# Patient Record
Sex: MALE | Race: WHITE
[De-identification: names, ages, dates, MRNs, and addresses within clinical notes are randomized per-mention and may not be internally consistent; named-entity substitution may affect disease eponyms.]

---

## 2017-05-10 ENCOUNTER — HOSPITAL ENCOUNTER (OUTPATIENT)
Dept: HOSPITAL 47 - RADXRMAIN | Age: 62
Discharge: HOME | End: 2017-05-10
Payer: COMMERCIAL

## 2017-05-10 DIAGNOSIS — S43.402A: Primary | ICD-10-CM

## 2017-05-10 NOTE — XR
EXAMINATION TYPE: XR shoulder complete LT

 

DATE OF EXAM: 5/10/2017 10:40 AM

 

COMPARISON: NONE

 

HISTORY: Pain

 

TECHNIQUE: Three views are submitted.

 

FINDINGS:

The osseous structures are intact.  There is no acute fracture or dislocation.  The AC joint is maint
ained.  

 

IMPRESSION:

1. No acute process.  If there is concern for internal derangement then consider MRI.

## 2018-06-26 ENCOUNTER — HOSPITAL ENCOUNTER (OUTPATIENT)
Dept: HOSPITAL 47 - EC | Age: 63
Setting detail: OBSERVATION
LOS: 1 days | Discharge: HOME | End: 2018-06-27
Attending: INTERNAL MEDICINE | Admitting: INTERNAL MEDICINE
Payer: COMMERCIAL

## 2018-06-26 DIAGNOSIS — R07.2: ICD-10-CM

## 2018-06-26 DIAGNOSIS — M25.512: ICD-10-CM

## 2018-06-26 DIAGNOSIS — Z79.82: ICD-10-CM

## 2018-06-26 DIAGNOSIS — R19.7: ICD-10-CM

## 2018-06-26 DIAGNOSIS — I10: ICD-10-CM

## 2018-06-26 DIAGNOSIS — Z82.49: ICD-10-CM

## 2018-06-26 DIAGNOSIS — Z98.84: ICD-10-CM

## 2018-06-26 DIAGNOSIS — Z86.73: ICD-10-CM

## 2018-06-26 DIAGNOSIS — S00.03XA: ICD-10-CM

## 2018-06-26 DIAGNOSIS — E78.5: ICD-10-CM

## 2018-06-26 DIAGNOSIS — G45.9: Primary | ICD-10-CM

## 2018-06-26 DIAGNOSIS — R07.89: ICD-10-CM

## 2018-06-26 DIAGNOSIS — Z79.899: ICD-10-CM

## 2018-06-26 DIAGNOSIS — E11.65: ICD-10-CM

## 2018-06-26 DIAGNOSIS — G89.29: ICD-10-CM

## 2018-06-26 DIAGNOSIS — I25.2: ICD-10-CM

## 2018-06-26 LAB
ALBUMIN SERPL-MCNC: 4 G/DL (ref 3.5–5)
ALP SERPL-CCNC: 65 U/L (ref 38–126)
ALT SERPL-CCNC: 31 U/L (ref 21–72)
ANION GAP SERPL CALC-SCNC: 12 MMOL/L
APTT BLD: 22.5 SEC (ref 22–30)
AST SERPL-CCNC: 29 U/L (ref 17–59)
BASOPHILS # BLD AUTO: 0 K/UL (ref 0–0.2)
BASOPHILS NFR BLD AUTO: 1 %
BUN SERPL-SCNC: 21 MG/DL (ref 9–20)
CALCIUM SPEC-MCNC: 9.5 MG/DL (ref 8.4–10.2)
CHLORIDE SERPL-SCNC: 100 MMOL/L (ref 98–107)
CK SERPL-CCNC: 119 U/L (ref 55–170)
CK SERPL-CCNC: 132 U/L (ref 55–170)
CK SERPL-CCNC: 158 U/L (ref 55–170)
CO2 SERPL-SCNC: 25 MMOL/L (ref 22–30)
EOSINOPHIL # BLD AUTO: 0.3 K/UL (ref 0–0.7)
EOSINOPHIL NFR BLD AUTO: 5 %
ERYTHROCYTE [DISTWIDTH] IN BLOOD BY AUTOMATED COUNT: 5.54 M/UL (ref 4.3–5.9)
ERYTHROCYTE [DISTWIDTH] IN BLOOD: 13.7 % (ref 11.5–15.5)
GLUCOSE SERPL-MCNC: 171 MG/DL (ref 74–99)
HCT VFR BLD AUTO: 45.9 % (ref 39–53)
HGB BLD-MCNC: 15.2 GM/DL (ref 13–17.5)
INR PPP: 1 (ref ?–1.2)
LYMPHOCYTES # SPEC AUTO: 1.6 K/UL (ref 1–4.8)
LYMPHOCYTES NFR SPEC AUTO: 26 %
MCH RBC QN AUTO: 27.4 PG (ref 25–35)
MCHC RBC AUTO-ENTMCNC: 33.1 G/DL (ref 31–37)
MCV RBC AUTO: 82.8 FL (ref 80–100)
MONOCYTES # BLD AUTO: 0.4 K/UL (ref 0–1)
MONOCYTES NFR BLD AUTO: 7 %
NEUTROPHILS # BLD AUTO: 3.7 K/UL (ref 1.3–7.7)
NEUTROPHILS NFR BLD AUTO: 59 %
PLATELET # BLD AUTO: 267 K/UL (ref 150–450)
POTASSIUM SERPL-SCNC: 3.6 MMOL/L (ref 3.5–5.1)
PROT SERPL-MCNC: 6.4 G/DL (ref 6.3–8.2)
PT BLD: 9.9 SEC (ref 9–12)
SODIUM SERPL-SCNC: 137 MMOL/L (ref 137–145)
TROPONIN I SERPL-MCNC: 0.01 NG/ML (ref 0–0.03)
TROPONIN I SERPL-MCNC: <0.012 NG/ML (ref 0–0.03)
TROPONIN I SERPL-MCNC: <0.012 NG/ML (ref 0–0.03)
WBC # BLD AUTO: 6.2 K/UL (ref 3.8–10.6)

## 2018-06-26 PROCEDURE — 70450 CT HEAD/BRAIN W/O DYE: CPT

## 2018-06-26 PROCEDURE — 80053 COMPREHEN METABOLIC PANEL: CPT

## 2018-06-26 PROCEDURE — 36415 COLL VENOUS BLD VENIPUNCTURE: CPT

## 2018-06-26 PROCEDURE — 71046 X-RAY EXAM CHEST 2 VIEWS: CPT

## 2018-06-26 PROCEDURE — 83735 ASSAY OF MAGNESIUM: CPT

## 2018-06-26 PROCEDURE — 99285 EMERGENCY DEPT VISIT HI MDM: CPT

## 2018-06-26 PROCEDURE — 96374 THER/PROPH/DIAG INJ IV PUSH: CPT

## 2018-06-26 PROCEDURE — 93306 TTE W/DOPPLER COMPLETE: CPT

## 2018-06-26 PROCEDURE — 84100 ASSAY OF PHOSPHORUS: CPT

## 2018-06-26 PROCEDURE — 96361 HYDRATE IV INFUSION ADD-ON: CPT

## 2018-06-26 PROCEDURE — 85730 THROMBOPLASTIN TIME PARTIAL: CPT

## 2018-06-26 PROCEDURE — 83090 ASSAY OF HOMOCYSTEINE: CPT

## 2018-06-26 PROCEDURE — 93351 STRESS TTE COMPLETE: CPT

## 2018-06-26 PROCEDURE — 82553 CREATINE MB FRACTION: CPT

## 2018-06-26 PROCEDURE — 70551 MRI BRAIN STEM W/O DYE: CPT

## 2018-06-26 PROCEDURE — 80048 BASIC METABOLIC PNL TOTAL CA: CPT

## 2018-06-26 PROCEDURE — 93880 EXTRACRANIAL BILAT STUDY: CPT

## 2018-06-26 PROCEDURE — 85025 COMPLETE CBC W/AUTO DIFF WBC: CPT

## 2018-06-26 PROCEDURE — 85610 PROTHROMBIN TIME: CPT

## 2018-06-26 PROCEDURE — 80061 LIPID PANEL: CPT

## 2018-06-26 PROCEDURE — 84484 ASSAY OF TROPONIN QUANT: CPT

## 2018-06-26 PROCEDURE — 93005 ELECTROCARDIOGRAM TRACING: CPT

## 2018-06-26 PROCEDURE — 96375 TX/PRO/DX INJ NEW DRUG ADDON: CPT

## 2018-06-26 PROCEDURE — 95819 EEG AWAKE AND ASLEEP: CPT

## 2018-06-26 PROCEDURE — 82550 ASSAY OF CK (CPK): CPT

## 2018-06-26 RX ADMIN — NITROGLYCERIN STA: 20 OINTMENT TOPICAL at 14:25

## 2018-06-26 RX ADMIN — NITROGLYCERIN STA: 20 OINTMENT TOPICAL at 14:23

## 2018-06-26 RX ADMIN — CALCIUM POLYCARBOPHIL SCH: 625 TABLET, FILM COATED ORAL at 23:00

## 2018-06-26 NOTE — CONS
CONSULTATION



CHIEF COMPLAINT:

Left facial numbness.



Mr. Junior is a 63-year-old gentleman who is from [place] who works here in

[place] presented to the hospital with sudden onset of left-sided facial numbness.

He describes it as the feeling he gets when he gets injected by Novocain at a dentist.

He subsequently had some chest discomfort and has chronic left shoulder pain.  The

facial numbness has gradually resolved by the time he came to the ER.  The patient has

a history of TIA also. The patient had some sense of dizziness and he also had some

chest tightness with elevated blood pressures.  These symptoms have since resolved.  He

also received a dose of morphine in the ER for chest pain.  At the time of my

evaluation, he appears comfortable at rest.  Does not have any left facial numbness and

does not have any chest pain.  An EKG on him shows sinus rhythm with evidence of prior

inferior wall myocardial infarction.  The patient's clinical presentation could be

related to a TIA or the left facial numbness may be related to underlying significant

obstructive CAD.  He had a CT scan of the brain that is negative.  I asked the ER

physician to talk to the neurologist and see if the patient can be started on IV

heparin.  Also the patient tells me that he lives in very close to [facility] and

wishes to go there so that it is easy for his wife to commute and see him in the

hospital, so I asked the ER doctor to make arrangements for the patient to go to

[facility]. If he is not going there and he is admitted here, we will do further

workup depending upon what the neurologist thinks. If this is not an episode of TIA,

then I will do a cardiac catheterization on him.  If they think it is TIA, will

consider a stress test either during hospitalization or after discharge. I discussed

these issues at length with the patient.  He understands and in agreement with the

plan.



PAST MEDICAL HISTORY:

Significant for hypertension and dyslipidemia.



MEDICATIONS:

At home include Catapres 0.2 t.i.d., Wellbutrin, Norvasc 5 daily, lisinopril 40 daily,

Lipitor 40 daily and aspirin.



ALLERGIES:

There are no known drug allergies.



FAMILY HISTORY:

Negative for premature coronary artery disease.



SOCIAL HISTORY:

Negative for smoking, EtOH abuse, or drug abuse.



PAST SURGICAL HISTORY:

Significant for gastric stapling.



REVIEW OF SYSTEMS:

HEENT is unremarkable.

CARDIAC: As described above.

RESPIRATORY: Negative.

GI: Negative.

GENITOURINARY: Negative.

ALLERGY/IMMUNOLOGY: Negative.

SKIN: Negative.

MUSCULOSKELETAL: Negative.

ENDOCRINE: Negative.

DERM: Negative.

CONSTITUTIONAL: Negative.

ONCOLOGICAL: Negative.

CNS: As described above.



Rest of the system review is not relevant.



EXAM:

Comfortable at rest.  Afebrile.  Heart rate is 70 beats per minute.  Blood pressure is

150/80, respirations 18, O2 sat is 100% on 2 L. There is no jugular venous distention.

Chest exam reveals good air entry bilaterally.  Heart exam reveals first and second

heart sounds.  No gallop.  No murmur.  No rub.  Abdomen is soft, nontender.  Exam of

extremities did not reveal any edema.  Peripheral pulses are felt. CNS exam did not

reveal focal neurological deficits.



EKG shows sinus rhythm with evidence of prior inferior wall myocardial infarction.



LABS:

Labs show that the 1st set of troponin is negative.  Potassium is 3.6, hemoglobin is

15.2.



ASSESSMENT:

1. Precordial chest pain.

2. Left facial numbness, rule out transient ischemic attack.

3. Hypertension.

4. Dyslipidemia.



PLAN:

Will control the blood pressure.  Start IV heparin once a neurologist okays it and

further care based on whether he is transferred to [place] or he stays here.





MMODL / IJN: [id number] / Job#: [id number]

## 2018-06-26 NOTE — CT
EXAMINATION TYPE: CT brain wo con

 

DATE OF EXAM: 6/26/2018

 

HISTORY: Numbness and Slurred Speech, neurodeficits per order.

 

CT DLP: 1290 mGycm.  Automated Exposure Control for Dose Reduction was Utilized.

 

TECHNIQUE: CT scan of the head is performed without contrast.

 

COMPARISON: None.

 

FINDINGS:   There is no acute intracranial hemorrhage or midline shift identified. There is diffuse v
entricular and sulcal prominence consistent with diffuse age-related cerebral atrophy.  There is low-
attenuation in the periventricular white matter consistent with chronic small vessel ischemic change.
 Area of lacunar infarction left thalamus is present axial image 31. There is moderate size left post
erior frontal hematoma axial image 44. The adjacent calvarium is intact. Streak artifact limits evalu
ation at level of foramen magnum from cavitary fillings. Some vascular calcification distal internal 
carotid and vertebral arteries is noted.

 

IMPRESSION:  No acute intracranial hemorrhage or midline shift.  There is mild diffuse age-related ce
rebral atrophy and mild to moderate chronic small vessel ischemic change as well as old left thalamic
 lacunar infarct all redemonstrated.  There is moderate-sized acute left posterior frontal scalp hema
sally noted.

 

If clinical concern for acute stroke persists further investigation with MRI study may be warranted.

## 2018-06-26 NOTE — XR
EXAMINATION TYPE: XR chest 2V

 

DATE OF EXAM: 6/26/2018

 

COMPARISON: NONE

 

HISTORY: Facial numbness and weakness.

 

TECHNIQUE:  Frontal and lateral views of the chest are obtained.

 

FINDINGS: Low lung volumes are present.  There is no focal air space opacity, pleural effusion, or pn
eumothorax seen.  The cardiac silhouette size is within normal limits. Retrocardiac opacity consisten
t with moderate size hiatal hernia is noted. There is moderate to severe multilevel anterior and late
ral spurring in the spine. Surgical clips epigastric region are noted. There is partial visualization
 of surgical hardware left shoulder level.

 

IMPRESSION:  Low lung volumes without suspicious acute pulmonary process.

## 2018-06-26 NOTE — P.HPIM
History of Present Illness


H&P Date: 06/26/18


Chief Complaint: Left facial numbness





63-year-old male with history of hypertension and TIA presented to emergency 

department because this morning while he was at work he suddenly felt flushed 

in the face, his eyes were red and the left side of his face around the mouth 

went numb.  He denied having any weakness in any of his extremities or weakness 

in the face.  Currently is feeling in his face is back.  No blurry vision or 

double vision, no slurred speech.  He felt slightly dizzy and had left-sided 

chest pain as well.  The chest pain continued until the time of evaluation.  

The pain was located over the left shoulder and he wasn't sure if it was truly 

chest pain or just shoulder discomfort.  Patient was able to walk okay after he 

had this episode.  No sick contacts.  No recent illness, no fevers or chills, 

no nausea or vomiting.  Patient stated that he had an episode of diarrhea today.





Review of Systems





12 point review of system performed, negative except for HPI





Past Medical History


Past Medical History: CVA/TIA, Diabetes Mellitus, Hyperlipidemia, Hypertension


History of Any Multi-Drug Resistant Organisms: None Reported


Past Surgical History: Back Surgery, Bariatric Surgery, Joint Replacement


Additional Past Surgical History / Comment(s): gastric sleeve, left shoulder 

replacement


Past Psychological History: No Psychological Hx Reported


Smoking Status: Never smoker


Past Alcohol Use History: Rare


Past Drug Use History: None Reported





- Past Family History


  ** Mother


Additional Family Medical History / Comment(s): mom is alive at age 93 has a 

pacemaker





  ** Father


Family Medical History: CVA/TIA, Myocardial Infarction (MI)





Medications and Allergies


 Home Medications











 Medication  Instructions  Recorded  Confirmed  Type


 


Aspirin EC [Ecotrin Low Dose] 81 mg PO HS 06/26/18 06/26/18 History


 


Atorvastatin [Lipitor] 40 mg PO HS 06/26/18 06/26/18 History


 


Calcium Polycarbophil [Fibercon] 1,250 mg PO BID 06/26/18 06/26/18 History


 


Lisinopril 40 mg PO DAILY 06/26/18 06/26/18 History


 


amLODIPine [Norvasc] 5 mg PO DAILY 06/26/18 06/26/18 History


 


buPROPion HCL [Wellbutrin XL] 300 mg PO DAILY 06/26/18 06/26/18 History


 


cloNIDine HCL [Catapres] 0.2 mg PO TID 06/26/18 06/26/18 History











 Allergies











Allergy/AdvReac Type Severity Reaction Status Date / Time


 


No Known Allergies Allergy   Verified 06/26/18 09:25














Physical Exam


Vitals: 


 Vital Signs











  Temp Pulse Resp BP Pulse Ox


 


 06/26/18 11:05   72  18  150/83  100


 


 06/26/18 09:43   65  18  161/88  98


 


 06/26/18 07:56  98.1 F  74  18  202/127  98








 Intake and Output











 06/25/18 06/26/18 06/26/18





 22:59 06:59 14:59


 


Other:   


 


  Weight   96.162 kg














Constitutional: No acute distress, conversant, pleasant


Eyes:Anicteric sclerae, moist conjunctiva, no lid-lag, PERRLA, 


ENMT: Oropharynx clear, no erythema, exudates


Neck: Supple, FROM, no masses, or JVD, No carotid bruits, No thyromegaly


Lungs: Clear to auscultation, Clear to percussion, Normal respiratory effort, 

no accessory muscle use 


Cardiovascular: Heart regular in rate and rhythm, No murmurs, gallops, or rubs, 

No peripheral edema


Abdominal: Soft, Nontender, no guarding, rebound or rigidity, Normoactive bowel 

sounds, No hepatomegaly, No splenomegaly, No palpable mass 


Skin: Normal temperature, tone, texture, turgor, no induration, No subcutaneous 

nodules, No rash, lesions, No ulcers


Extremities: No digital cyanosis, No clubbing, Pedal pulses intact and 

symmetrical, Radial pulses intact and symmetrical, No calf tenderness 


Psychiatric: Alert and oriented to person, place and time, appropriate affect, 

intact judgement         


Neuro: Muscles Strength 5/5 in all 4 extremities, Sensation to light touch 

grossly present throughout, Cranial nerves II-XII grossly intact, no focal 

sensory deficits








Results


CBC & Chem 7: 


 06/26/18 08:22





 06/26/18 08:22


Labs: 


 Abnormal Lab Results - Last 24 Hours (Table)











  06/26/18 Range/Units





  08:22 


 


BUN  21 H  (9-20)  mg/dL


 


Glucose  171 H  (74-99)  mg/dL














Assessment and Plan


Plan: 





Left facial numbness


Could be secondary to TIA or stroke


Admit to observation with telemetry


MRI of the brain to rule out stroke


Neurology consult


No need for full stroke workup at this point as his symptoms are not clearly 

secondary to stroke/TIA.





Left-sided chest pain


Telemetry


Cycle troponins


Cardiology evaluation





Hyperlipidemia/hypertension essential


Stable


Resume home meds





DVT prophylaxis


Ambulatory, low risk





Anticipated discharge: 1-2 days


Disposition: Likely home

## 2018-06-26 NOTE — CONS
CONSULTATION



DATE OF CONSULTATION:

06/26/2018



CHIEF COMPLAINT:

Transient ischemic attack.



HISTORY OF PRESENT ILLNESS:

The patient is a pleasant 63-year-old  male who is being evaluated by the

Neurology service per the request of Dr. Monroe for a transient ischemic attack.  The

patient was brought into [facility] after he had a sudden onset

of numbness and tingling involving his left face.  The patient states that he did

evaluate his face on the mirror and he noticed no facial drooping or weakness.  The

numbness lasted approximately 1 hour and resolved spontaneously.  In the emergency

room, a CT scan of the brain was done, which showed generalized atrophy and small-

vessel ischemic changes.  His blood pressure was found to be significantly elevated at

202/127.  He was given IV antihypertensive and his blood pressure is better controlled

at this time.  In the emergency room, the patient developed a severe chest pain.  His

EKG was normal and his cardiac enzymes have been negative thus far after two sets.

Cardiology has been consulted.  The patient does take aspirin daily at home.  His CBC

and INR were normal.  His comprehensive metabolic profile was normal except for

hyperglycemia at 171.  At the time of my evaluation, the patient is resting in his bed

and appears to be in no acute distress.  He denies any recurrence of any neurological

symptoms at this time.  He does report a previous history of transient ischemic attack,

which is why he takes aspirin daily.



PAST MEDICAL HISTORY:

Transient ischemic attack, diabetes, dyslipidemia, hypertension, history of spine

surgery, joint replacement surgery, bariatric surgery, gastric sleeve, left shoulder

surgery.



SOCIAL HISTORY:

He denies any tobacco or drug use.  He rarely drinks alcohol.



FAMILY HISTORY:

Positive for heart disease and strokes.



HOME MEDICATIONS:

Reviewed in the chart.



ALLERGIES:

No known drug allergies.



REVIEW OF SYSTEMS:

CONSTITUTIONAL:  Negative.

EYES:  Negative.

ENT:  Negative.

CARDIOVASCULAR:  As mentioned above.

RESPIRATORY:  Negative.

NEUROLOGICAL:  As mentioned above.

GASTROINTESTINAL:  Negative.

GENITOURINARY:  Negative.

PSYCHIATRIC:  Negative.

ENDOCRINE:  Positive for diabetes.

MUSCULOSKELETAL:  Positive for occasional joint pain.

DERMATOLOGICAL:  Negative.



PHYSICAL EXAM:

Vital signs show a temperature of 98.4, pulse 99, respiration 18, blood pressure

185/91.

GENERAL APPEARANCE:  The patient is a well-developed  male, who appears to be

in no acute distress.

HEENT:  Normocephalic, atraumatic, no facial asymmetry is seen. Extraocular muscles are

intact.

NECK:  Supple with no masses felt.

CARDIOVASCULAR:  Regular rate and rhythm.

ABDOMEN:  Nontender, nondistended.

Extremities showed no edema or clubbing.  NEUROLOGICAL EXAM:  The patient is awake and

oriented x3.  Speech and language are normal.  Strength is full in all 4 extremities.

Sensory exam was normal to light touch in all 4 extremities.  No pronator drift is

seen.  Finger-nose-finger testing showed no dysmetria.  No facial asymmetry is noticed

on cranial nerve testing.



IMPRESSION:

1. Transient ischemic attack.

2. Left facial numbness, resolved.

3. Atypical chest pain.

4. Small vessel ischemic disease.



RECOMMENDATION:

The patient does appear to have suffered a transient ischemic attack with a transient

episode of left facial numbness and tingling.  His symptoms did resolve after 1 hour

without any recurrence.  The patient was on aspirin daily at home.  I will switch his

aspirin to Plavix 75 mg daily.  I will order a carotid Doppler, fasting lipid panel,

EEG, and serum homocystine level.  His blood pressure continues to be elevated,

although it is improved from his emergency room arrival blood pressure.  Cardiology is

following the patient.  Continue neuro checks.  I will continue to follow with you.

Further recommendations to follow.



Thank you for allowing me to participate in the care of your patient.  If you have any

questions, please feel free to contact me.





MMKHADARL / IJN: [id number] / Job#: [id number]

## 2018-06-26 NOTE — ED
Neuro HPI





- General


Chief Complaint: Neuro Symptoms/Deficit


Stated Complaint: Numbness, slurred speech


Time Seen by Provider: 06/26/18 08:27


Source: patient


Mode of arrival: ambulatory


Limitations: no limitations





- History of Present Illness


Is the patient presenting with stroke symptoms?: No


Initial Comments: 


63 years old male with a history of TIA, hypertension and diabetes type 2 he is 

a status post gastric sleeve he has lost over 70 pounds now he is not on any 

medication at this point early morning around 7:30 he has a left-sided facial 

numbness and facial numbness has resolved by the time he arrived here he has a 

history of TIAs and now he is feeling dizzy prior to arrival to the ER he 

noticed some chest pains chest pains in the left side they do not radiate 

anywhere no nausea no vomiting no cold sweats a blood pressure is quite 

elevated blood pressure was close to 200 systolic on arrival review of system 

is otherwise unremarkable no motor deficits right now








- Related Data


Home Medications: 


 Home Medications











 Medication  Instructions  Recorded  Confirmed


 


Aspirin EC [Ecotrin Low Dose] 81 mg PO HS 06/26/18 06/26/18


 


Atorvastatin [Lipitor] 40 mg PO HS 06/26/18 06/26/18


 


Calcium Polycarbophil [Fibercon] 1,250 mg PO BID 06/26/18 06/26/18


 


Lisinopril 40 mg PO DAILY 06/26/18 06/26/18


 


amLODIPine [Norvasc] 5 mg PO DAILY 06/26/18 06/26/18


 


buPROPion HCL [Wellbutrin XL] 300 mg PO DAILY 06/26/18 06/26/18


 


cloNIDine HCL [Catapres] 0.2 mg PO TID 06/26/18 06/26/18











Allergies/Adverse Reactions: 


 Allergies











Allergy/AdvReac Type Severity Reaction Status Date / Time


 


No Known Allergies Allergy   Verified 06/26/18 09:25














Review of Systems


ROS Statement: 


Those systems with pertinent positive or pertinent negative responses have been 

documented in the HPI.





ROS Other: All systems not noted in ROS Statement are negative.





General Exam





- General Exam Comments


Initial Comments: 


General:  The patient is awake and alert, in no distress, and does not appear 

acutely ill. 


Skin:  Skin is warm and dry and no rashes or lesions are noted. 


Eye:  Pupils are equal, round and reactive to light, extra-ocular movements are 

intact; there is normal conjunctiva bilaterally.  


Ears, nose, mouth and throat:  There are moist mucous membranes and no oral 

lesions. 


Neck:  The neck is supple, there is no tenderness  or JVD.  


Cardiovascular:  There is a regular rate and rhythm. No murmur, rub or gallop 

is appreciated.


Respiratory: To auscultation bilateral, no wheezing no rhonchi no distress  

respiratory wise noticed


Gastrointestinal:  Soft, non-distended, non-tender abdomen without masses or 

organomegaly noted. There is no rebound or guarding present. Bowel sounds are 

unremarkable. 


Back:  There is no tenderness to palpation in the midline. There is no obvious 

deformity.


Musculoskeletal:  Normal ROM, no tenderness, There is no pedal edema. There is 

no calf tenderness or swelling. No cords were appreciated.  


Neurological:  CN II-XII intact, Cranial nerves III through XII are intact. 

There are no obvious motor or sensory deficits. Coordination appears grossly 

intact. Speech is normal.


Neuro deficits noticed at all


Psychiatric:  Cooperative, appropriate mood & affect, normal judgment.  








Limitations: no limitations





Stroke MDM





- Lab Data


Result diagrams: 


 06/26/18 08:22





 06/26/18 08:22





 Lab Results











  06/26/18 06/26/18 06/26/18 Range/Units





  08:22 08:22 08:22 


 


WBC   6.2   (3.8-10.6)  k/uL


 


RBC   5.54   (4.30-5.90)  m/uL


 


Hgb   15.2   (13.0-17.5)  gm/dL


 


Hct   45.9   (39.0-53.0)  %


 


MCV   82.8   (80.0-100.0)  fL


 


MCH   27.4   (25.0-35.0)  pg


 


MCHC   33.1   (31.0-37.0)  g/dL


 


RDW   13.7   (11.5-15.5)  %


 


Plt Count   267   (150-450)  k/uL


 


Neutrophils %   59   %


 


Lymphocytes %   26   %


 


Monocytes %   7   %


 


Eosinophils %   5   %


 


Basophils %   1   %


 


Neutrophils #   3.7   (1.3-7.7)  k/uL


 


Lymphocytes #   1.6   (1.0-4.8)  k/uL


 


Monocytes #   0.4   (0-1.0)  k/uL


 


Eosinophils #   0.3   (0-0.7)  k/uL


 


Basophils #   0.0   (0-0.2)  k/uL


 


PT     (9.0-12.0)  sec


 


INR     (<1.2)  


 


APTT     (22.0-30.0)  sec


 


Sodium    137  (137-145)  mmol/L


 


Potassium    3.6  (3.5-5.1)  mmol/L


 


Chloride    100  ()  mmol/L


 


Carbon Dioxide    25  (22-30)  mmol/L


 


Anion Gap    12  mmol/L


 


BUN    21 H  (9-20)  mg/dL


 


Creatinine    0.84  (0.66-1.25)  mg/dL


 


Est GFR (CKD-EPI)AfAm    >90  (>60 ml/min/1.73 sqM)  


 


Est GFR (CKD-EPI)NonAf    >90  (>60 ml/min/1.73 sqM)  


 


Glucose    171 H  (74-99)  mg/dL


 


Calcium    9.5  (8.4-10.2)  mg/dL


 


Total Bilirubin    0.7  (0.2-1.3)  mg/dL


 


AST    29  (17-59)  U/L


 


ALT    31  (21-72)  U/L


 


Alkaline Phosphatase    65  ()  U/L


 


Total Creatine Kinase  158    ()  U/L


 


CK-MB (CK-2)  2.1    (0.0-2.4)  ng/mL


 


CK-MB (CK-2) Rel Index  1.3    


 


Troponin I  <0.012    (0.000-0.034)  ng/mL


 


Total Protein    6.4  (6.3-8.2)  g/dL


 


Albumin    4.0  (3.5-5.0)  g/dL














  06/26/18 Range/Units





  08:22 


 


WBC   (3.8-10.6)  k/uL


 


RBC   (4.30-5.90)  m/uL


 


Hgb   (13.0-17.5)  gm/dL


 


Hct   (39.0-53.0)  %


 


MCV   (80.0-100.0)  fL


 


MCH   (25.0-35.0)  pg


 


MCHC   (31.0-37.0)  g/dL


 


RDW   (11.5-15.5)  %


 


Plt Count   (150-450)  k/uL


 


Neutrophils %   %


 


Lymphocytes %   %


 


Monocytes %   %


 


Eosinophils %   %


 


Basophils %   %


 


Neutrophils #   (1.3-7.7)  k/uL


 


Lymphocytes #   (1.0-4.8)  k/uL


 


Monocytes #   (0-1.0)  k/uL


 


Eosinophils #   (0-0.7)  k/uL


 


Basophils #   (0-0.2)  k/uL


 


PT  9.9  (9.0-12.0)  sec


 


INR  1.0  (<1.2)  


 


APTT  22.5  (22.0-30.0)  sec


 


Sodium   (137-145)  mmol/L


 


Potassium   (3.5-5.1)  mmol/L


 


Chloride   ()  mmol/L


 


Carbon Dioxide   (22-30)  mmol/L


 


Anion Gap   mmol/L


 


BUN   (9-20)  mg/dL


 


Creatinine   (0.66-1.25)  mg/dL


 


Est GFR (CKD-EPI)AfAm   (>60 ml/min/1.73 sqM)  


 


Est GFR (CKD-EPI)NonAf   (>60 ml/min/1.73 sqM)  


 


Glucose   (74-99)  mg/dL


 


Calcium   (8.4-10.2)  mg/dL


 


Total Bilirubin   (0.2-1.3)  mg/dL


 


AST   (17-59)  U/L


 


ALT   (21-72)  U/L


 


Alkaline Phosphatase   ()  U/L


 


Total Creatine Kinase   ()  U/L


 


CK-MB (CK-2)   (0.0-2.4)  ng/mL


 


CK-MB (CK-2) Rel Index   


 


Troponin I   (0.000-0.034)  ng/mL


 


Total Protein   (6.3-8.2)  g/dL


 


Albumin   (3.5-5.0)  g/dL














Past Medical History


Past Medical History: CVA/TIA, Diabetes Mellitus, Hyperlipidemia, Hypertension


History of Any Multi-Drug Resistant Organisms: None Reported


Past Surgical History: Back Surgery, Bariatric Surgery, Joint Replacement


Additional Past Surgical History / Comment(s): gastric sleeve, left shoulder 

replacement


Past Psychological History: No Psychological Hx Reported


Smoking Status: Never smoker


Past Alcohol Use History: Rare


Past Drug Use History: None Reported





Course





 Vital Signs











  06/26/18 06/26/18





  07:56 09:43


 


Temperature 98.1 F 


 


Pulse Rate 74 65


 


Respiratory 18 18





Rate  


 


Blood Pressure 202/127 161/88


 


O2 Sat by Pulse 98 98





Oximetry  








KG is normal sinus ventricular rate is 62 ID interval is 204 QRS duration is 98 

QT/QTc is 460/2422 review of this EKG reveals T-wave inversion in lead 3 and a T

-wave a T-wave flattening in aVF also noticed T-wave inversion in AVR poor T 

waves in V2 no ST elevation or ST depression noticed in the rest of the leads





(Reassessment CBC, INR, comp his metabolic panel, EKG, troponin are 

unremarkable head CT didn't reveal any abnormal intracranial findings patient's 

blood pressures high and he still has a mild chest discomfort he be admitted to 

Dr. Nash service cardiology be consulted and neurology be consulted





Critical Care Time


Total Critical Care Time: 30


Critical Care Time: 





Since neurological symptoms had resolved by the time he arrived to the ER, 

blood pressure was) systolic 200 and head CT ruled out any intracranial bleed 

that aspirin was given 81 mg and a considering his very low heartrate we held 

off to labetalol he was given some hydralazine and now he still has a chest 

pain we can admit him under acute coronary syndrome considering he has a 

history of diabetes and hypertension he is not on any diabetes medications 

because he lost over 70 pounds after he had a gastric sleeve his blood pressure 

is also rather uncontrolled at this point after admission he becomes seeing 

neurology as well as cardiology





Disposition


Clinical Impression: 


 Numbness and tingling of left side of face, Chest pain





Disposition: ADMITTED AS IP TO THIS HOSP


Condition: Good


Referrals: 


Jony Troncoso DO [Primary Care Provider] - 1-2 days

## 2018-06-27 VITALS
HEART RATE: 94 BPM | DIASTOLIC BLOOD PRESSURE: 88 MMHG | TEMPERATURE: 98 F | SYSTOLIC BLOOD PRESSURE: 158 MMHG | RESPIRATION RATE: 20 BRPM

## 2018-06-27 LAB
ANION GAP SERPL CALC-SCNC: 7 MMOL/L
BASOPHILS # BLD AUTO: 0 K/UL (ref 0–0.2)
BASOPHILS NFR BLD AUTO: 1 %
BUN SERPL-SCNC: 18 MG/DL (ref 9–20)
CALCIUM SPEC-MCNC: 9 MG/DL (ref 8.4–10.2)
CHLORIDE SERPL-SCNC: 100 MMOL/L (ref 98–107)
CHOLEST SERPL-MCNC: 133 MG/DL (ref ?–200)
CO2 SERPL-SCNC: 31 MMOL/L (ref 22–30)
EOSINOPHIL # BLD AUTO: 0.3 K/UL (ref 0–0.7)
EOSINOPHIL NFR BLD AUTO: 5 %
ERYTHROCYTE [DISTWIDTH] IN BLOOD BY AUTOMATED COUNT: 4.54 M/UL (ref 4.3–5.9)
ERYTHROCYTE [DISTWIDTH] IN BLOOD: 13.8 % (ref 11.5–15.5)
GLUCOSE SERPL-MCNC: 180 MG/DL (ref 74–99)
HCT VFR BLD AUTO: 37.9 % (ref 39–53)
HDLC SERPL-MCNC: 34 MG/DL (ref 40–60)
HGB BLD-MCNC: 13.1 GM/DL (ref 13–17.5)
LDLC SERPL CALC-MCNC: 66 MG/DL (ref 0–99)
LYMPHOCYTES # SPEC AUTO: 1.7 K/UL (ref 1–4.8)
LYMPHOCYTES NFR SPEC AUTO: 29 %
MAGNESIUM SPEC-SCNC: 1.7 MG/DL (ref 1.6–2.3)
MCH RBC QN AUTO: 28.8 PG (ref 25–35)
MCHC RBC AUTO-ENTMCNC: 34.4 G/DL (ref 31–37)
MCV RBC AUTO: 83.6 FL (ref 80–100)
MONOCYTES # BLD AUTO: 0.5 K/UL (ref 0–1)
MONOCYTES NFR BLD AUTO: 9 %
NEUTROPHILS # BLD AUTO: 3.2 K/UL (ref 1.3–7.7)
NEUTROPHILS NFR BLD AUTO: 55 %
PLATELET # BLD AUTO: 220 K/UL (ref 150–450)
POTASSIUM SERPL-SCNC: 3.6 MMOL/L (ref 3.5–5.1)
SODIUM SERPL-SCNC: 138 MMOL/L (ref 137–145)
TRIGL SERPL-MCNC: 165 MG/DL (ref ?–150)
WBC # BLD AUTO: 5.8 K/UL (ref 3.8–10.6)

## 2018-06-27 RX ADMIN — CALCIUM POLYCARBOPHIL SCH: 625 TABLET, FILM COATED ORAL at 11:13

## 2018-06-27 NOTE — EEG
ELECTROENCEPHALOGRAM REPORT



DATE OF SERVICE:

06/27/2018



REASON FOR TESTING:

Transient ischemic attack.



DESCRIPTION OF THE PROCEDURE:

This EEG was performed using a 21-channel digital electroencephalograph, following

international 10-20 system.



DESCRIPTION OF THE RECORDING:

From the beginning of the tracing, and with the patient's eyes closed, the background

rhythm was mostly consisting of 8 Hz alpha frequency in the posterior occipital leads.

No obvious asymmetry is seen.  Photic stimulation was performed with a minimal driving

response seen.  No pathological waves were elicited.  Hyperventilation was not

performed.  Lead artifacts are seen mainly in the right frontal lead.  The patient does

reach stage II of sleep during the tracing and occasional sleep spindles are seen.  No

epileptiform discharges were seen.  His EKG lead showed a regular rate and rhythm.



INTERPRETATION:

This asleep and awake EEG can be considered within normal limits.  There was no

asymmetry seen.  No epileptiform discharges were noticed.  The absence of epileptiform

discharges does not rule out the diagnosis of epilepsy; therefore clinical correlation

is recommended.





MMKARLY / ELIUN: [id number] / Job#: [id number]

## 2018-06-27 NOTE — ECHOS
STRESS ECHOCARDIOGRAM



INDICATIONS:

Chest pain.



BASELINE HEART RATE:

65



BASELINE BLOOD PRESSURE:

170/92



MAXIMUM HEART RATE:

136



MAXIMUM BLOOD PRESSURE:

202/103



85% MPHR:

133



100% MPHR:

157



METS:

8.5



MAXIMUM STAGE REACHED:

3



TOTAL EXERCISE TIME:

7:00



CLINICAL INFORMATION:

Baseline EKG shows sinus rhythm, normal axis, normal intervals.  Patient exercised on

Trev protocol for a total of 7 minutes achieving 8 METS, 86% of predicted maximum

heart rate without chest pain or diagnostic ST-segment depression.  The test was

stopped secondary to discomfort in his legs.



Baseline echo shows normal left ventricular size, wall motion, systolic function.

Postexercise there is normal hyperdynamic response of all segments of myocardium noted.



CONCLUSIONS:

1. Average exercise tolerance.

2. Negative stress test by EKG criteria.

3. Negative stress echo.





MMODL / IJN: [id number] / Job#: [id number]

## 2018-06-27 NOTE — US
EXAMINATION TYPE: US carotid duplex BILAT

 

DATE OF EXAM: 6/27/2018

 

COMPARISON: NONE

 

CLINICAL HISTORY: TIA.

 

EXAM MEASUREMENTS: 

 

RIGHT:  Peak Systolic Velocity (PSV) cm/sec

----- Right CCA:  77.6

----- Right ICA:  124.2     

----- Right ECA:  96.6   

ICA/CCA ratio:  1.6    

 

RIGHT:  End Diastole cm/sec

----- Right CCA:  19.3   

----- Right ICA:  30.5      

----- Right ECA:  6.5     

 

LEFT:  Peak Systolic Velocity (PSV) cm/sec

----- Left CCA:  88.1  

----- Left ICA:  101.7   

----- Left ECA: 87.1  

ICA/CCA ratio:  1.2  

 

LEFT:  End Diastole cm/sec

----- Left CCA:  19.3  

----- Left ICA:  22.8   

----- Left ECA:  9.2 

 

VERTEBRALS (direction of flow):

Right Vertebral: Antegrade

Left Vertebral: Antegrade

 

 

Bilateral tortuous ICA's distally, moderate to severe plaque with no significant velocity increases.

 

Grayscale, color Doppler, spectral Doppler imaging performed of the carotid arteries. Waveform analys
is does not show significant stenosis of the proximal internal carotid arteries by Doppler criteria.

 

IMPRESSION:  No hemodynamic significant stenosis of the proximal internal carotid arteries bilaterall
y by Doppler criteria, an indirect measurement of carotid stenosis.

## 2018-06-27 NOTE — ECHOF
Referral Reason:cp



MEASUREMENTS

--------

HEIGHT: 152.4 cm

WEIGHT: 96.2 kg

BP: 

RVIDd:   3.3 cm     (< 3.3)

IVSd:   1.3 cm     (0.6 - 1.1)

LVIDd:   4.8 cm     (3.9 - 5.3)

LVPWd:   1.6 cm     (0.6 - 1.1)

IVSs:   1.8 cm

LVIDs:   3.4 cm

LVPWs:   1.5 cm

LA Diam:   4.0 cm     (2.7 - 3.8)

LAESV Index (A-L):   23.75 ml/m

Ao Diam:   2.8 cm     (2.0 - 3.7)

MV EXCURSION:   22.126 mm     (> 18.000)

MV EF SLOPE:   139 mm/s     (70 - 150)

EPSS:   0.2 cm

MV E Roby:   0.43 m/s

MV DecT:   295 ms

MV A Roby:   0.93 m/s

MV E/A Ratio:   0.47 

RAP:   5.00 mmHg

RVSP:   19.65 mmHg







FINDINGS

--------

Undetermined rhythm.

This was a technically adequate study.

The left ventricular size is normal.   There is mild concentric left ventricular hypertrophy.   Overa
ll left ventricular systolic function is normal with, an EF between 55 - 60 %.

The right ventricle is normal in size.

The left atrial size is normal.

The right atrial size is normal.

There is mild aortic valve sclerosis.   There is no evidence of aortic regurgitation.

Mild mitral annular calcification present.   Mild mitral regurgitation is present.

Mild tricuspid regurgitation present.   There is no evidence of pulmonary hypertension.   The right v
entricular systolic pressure, as measured by Doppler, is 19.65mmHg.

The pulmonic valve was not well visualized.

The aortic root size is normal.

Echo free space indicative of a pericardial fat pad.



CONCLUSIONS

--------

1. The left ventricular size is normal.

2. There is mild concentric left ventricular hypertrophy.

3. The right ventricle is normal in size.

4. The left atrial size is normal.

5. The right atrial size is normal.

6. There is mild aortic valve sclerosis.

7. Mild mitral annular calcification present.

8. Mild mitral regurgitation is present.

9. Mild tricuspid regurgitation present.

10. There is no evidence of pulmonary hypertension.

11. The right ventricular systolic pressure, as measured by Doppler, is 19.65mmHg.

12. The pulmonic valve was not well visualized.

13. The aortic root size is normal.

14. Echo free space indicative of a pericardial fat pad.





SONOGRAPHER: Rabia Siddiqui RDCS

## 2018-06-27 NOTE — MR
John Junior 

 

EXAMINATION TYPE: MR brain wo con

 

DATE OF EXAM: 6/27/2018

 

COMPARISON: 6/26/2018

 

HISTORY: 63-year-old male TIA, numbness and slurred speech

 

TECHNIQUE:  Multiplanar, multisequence images of the brain and brainstem were acquired without IV con
trast. Diffusion weighted imaging is performed. 

 

FINDINGS:  

No evidence for acute infarction, hemorrhage, mass, mass effect, midline shift, herniation, effacemen
t of basal cisterns, or extra-axial fluid collection. 

 

The ventricles and sulci are age-appropriate with mild atrophy.

 

Major intracranial flow voids are intact. Suspect persistent fetal origin of the right PCA.

 

T2/FLAIR weighted sequences show moderate patchy and confluent bright white matter change especially 
in the periventricular and deep white matter regions and a few foci in the subcortical regions of bot
h cerebral hemispheres. Old lacunar infarct left thalamus as seen on CT.

 

Midline structures demonstrate normal morphology.  The craniocervical junction is normal. 

 

Mild diffuse thickening throughout the ethmoid air cells. The globes are intact.

 

 

IMPRESSION:

 

1. No acute intracranial abnormality seen.

2. Mild atrophy and moderate patchy and confluent white matter changes, nonspecific, likely relating 
to chronic small vessel ischemic disease.

## 2018-06-27 NOTE — P.PN
Subjective


Progress Note Date: 06/27/18


This is a 63-year-old  gentleman who seen in consultation yesterday by 

Dr. Terry, he presented to the hospital with symptoms of sudden onset of left 

facial numbness, subsequent to that he also experience an episode of chest 

discomfort.  Patient is being followed by neurology who felt that the patient 

may have had a TIA.  Initial CAT scan of the brain did not reveal any acute 

intracranial hemorrhage or midline shift, mild diffuse age-related cerebral 

atrophy and mild to moderate chronic small vessel ischemic change as well as 

old left found that the sooner infarct demonstrated.  There is a moderate-sized 

acute left posterior frontal scalp hematoma noted.  His EKG showed normal sinus 

rhythm with nonspecific ST-T wave changes.  Echocardiogram with Doppler study 

was performed which revealed a normal left ventricular systolic function.  MRI 

of the brain did not reveal any acute intracranial abnormality.  Troponins were 

essentially negative 3.  At the time of her examination today, patient denied 

any further facial numbness, no chest discomfort.  He's been recommended to 

undergo stress echocardiographic study which will be performed today.  From 

cardiology's perspective if the stress test is negative he may be able to be 

stressed discharged home and follow-up in the office as an outpatient.  His 

blood pressure this morning was 187/92 prior to receiving any of his 

antihypertensives.  We did however increase his dose of Norvasc to 10 mg daily.








Objective





- Vital Signs


Vital signs: 


 Vital Signs











Temp  97.4 F L  06/27/18 08:00


 


Pulse  68   06/27/18 08:00


 


Resp  18   06/27/18 08:00


 


BP  187/92   06/27/18 08:00


 


Pulse Ox  98   06/27/18 08:00








 Intake & Output











 06/26/18 06/27/18 06/27/18





 18:59 06:59 18:59


 


Intake Total 240  


 


Balance 240  


 


Weight 96.162 kg  


 


Intake:   


 


  Oral 240  


 


Other:   


 


  Voiding Method  Toilet Toilet


 


  # Voids 1 1 0














- Exam


PHYSICAL EXAMINATION: 





GENERAL: 63-year-old  gentleman in no apparent distress at the time of 

my examination.





HEENT: Head is atraumatic, normocephalic.  Pupils equal, round.  Sclera 

anicteric. Conjunctiva are clear.  Mucous membranes of the mouth are moist.  

Neck is supple.  There is no elevated jugular venous pressure.]  bruit is heard.





HEART EXAMINATION: Heart S1, S2 normal.  No murmur or gallop heard.





CHEST EXAMINATION: Lungs are clear to auscultation and precussion. No chest 

wall tenderness is noted on palpation or with deep breathing.





ABDOMEN:  Soft, nontender. Bowel sounds are heard. No organomegaly noted.


 


EXTREMITIES: 2+ peripheral pulses with no evidence of peripheral edema and no 

calf tenderness noted.





NEUROLOGIC patient is awake, alert and oriented ?-3.


 


.


 











- Labs


CBC & Chem 7: 


 06/27/18 05:32





 06/26/18 08:22


Labs: 


 Abnormal Lab Results - Last 24 Hours (Table)











  06/27/18 Range/Units





  05:32 


 


Hct  37.9 L  (39.0-53.0)  %














Assessment and Plan


Plan: 


Assessment and plan


#1 left-sided facial numbness, neurology feels the patient may benefit TIA.  No 

acute findings on MRI.  Symptoms have resolved.


#2 atypical chest discomfort with negative troponins.  EKG shows normal sinus 

rhythm with nonspecific ST-T wave changes.  Patient scheduled to undergo stress 

echocardiographic study today.


#3 hypertension


#4 hyperlipidemia








Plan


Patient will be scheduled to undergo stress echocardiographic study today, if 

the stress test is negative then from our perspective he may be able to be 

discharged home today.








DNP note has been reviewed, I agree with a documented findings and plan of 

care.  Patient was seen and examined.

## 2018-06-27 NOTE — P.PN
Subjective


Progress Note Date: 06/27/18





Patient is a pleasant 63-year-old  male who is being followed by the 

neurology service for TIA.  Patient came to [facility] for evaluation after sudden onset of facial numbness and tingling.  

Patient states episode lasted about an hour.  Facial numbness have resolved 

spontaneously.  Computed tomography scan of the brain was done which showed no 

acute process but did show generalized atrophy and small vessel ischemic 

changes.  Blood pressure was elevated and he was given IV antihypertensive 

medication.  Patient was taking aspirin in the home setting which was switched 

to Plavix during this admission.  No recurrence of facial numbness since 

admission.  Patient states he is back to baseline with like to return to work.  

At the time of my evaluation, patient is resting comfortably in bed and appears 

to be in no acute distress.  No new or recurring neurological symptoms reported.





Objective





- Vital Signs


Vital signs: 


 Vital Signs











Temp  98 F   06/27/18 15:29


 


Pulse  94   06/27/18 15:29


 


Resp  20   06/27/18 15:29


 


BP  158/88   06/27/18 15:29


 


Pulse Ox  96   06/27/18 15:29








 Intake & Output











 06/26/18 06/27/18 06/27/18





 18:59 06:59 18:59


 


Intake Total 240  0


 


Balance 240  0


 


Weight 96.162 kg  96.162 kg


 


Intake:   


 


  IV   0


 


    Heparin Sod,Pork in 0.45%   0





    NaCl 25,000 unit In 0.45   





    % NaCl 1 500ml.bag @ 10.4   





    UNITS/KG/HR 20 mls/hr IV   





    .Q24H [facility] Rx#:[id number]   


 


  Oral 240  


 


Other:   


 


  Voiding Method  Toilet Toilet


 


  # Voids 1 1 2














- Exam





PHYSICAL EXAM:





GENERAL APPEARANCE: Patient is a well-developed,  male who appears to 

be in no acute distress.





HEENT: Normocephalic, atraumatic, no facial asymmetry is seen.  Neck is supple 

with no masses felt.





CARDIOVASCULAR: Regular rate and rhythm.





ABDOMEN: Nontender, nondistended.





EXTREMITIES: Show no edema or clubbing.





NEUROLOGICAL EXAM: Patient is awake, alert, and oriented 3.  Speech and 

language are normal.  Strength is full in all 4 extremities.  Sensory exam to 

light touch is normal in all 4 extremities.  No facial asymmetry seen on 

cranial nerve testing.  No tremors or seizure-like activity noted.  No pronator 

drift noted.





- Labs


CBC & Chem 7: 


 06/27/18 05:32





 06/27/18 05:32


Labs: 


 Abnormal Lab Results - Last 24 Hours (Table)











  06/27/18 06/27/18 Range/Units





  05:32 05:32 


 


Hct   37.9 L  (39.0-53.0)  %


 


Carbon Dioxide  31 H   (22-30)  mmol/L


 


Glucose  180 H   (74-99)  mg/dL


 


Triglycerides  165 H   (<150)  mg/dL


 


HDL Cholesterol  34 L   (40-60)  mg/dL














Assessment and Plan


Plan: 





Impression:


1.  Transient ischemic attack


2.  Hypertension


3.  Left facial numbness, resolved


4.  Small vessel ischemic disease


5.  Atypical chest pain





Recommendation: Patient does appear to have suffered transient ischemic attack 

with a transient episode of left facial numbness and tingling.  His symptoms 

are now resolved.  I recommend to continue Plavix 75 mg by mouth daily.  His 

carotid Doppler was negative for any hemodynamically significant stenosis.  EEG 

was done and results are pending.  Triglycerides were high at 165 and HDL was 

low at 34.  Continue statin therapy.  Homocystine level was within normal 

limits.  Patient was asking if he could drive, from a neurological standpoint 

there is no contraindication to his driving.  Patient did not lose 

consciousness.  I did discuss with patient about stroke prevention.  I 

encouraged patient to monitor his blood pressure in the home setting as well as 

staying on Plavix and statin.  Patient is stable for discharge from a 

neurological standpoint.  He can follow up in the office in a few weeks.  

Continue neurological checks.  Barring any pathology on the EEG, I will 

continue to follow with you on an as-needed basis.  Feel free to call with any 

questions or concerns.





I performed an examination of the patient and discussed the management with the 

NP.  I have reviewed the NP notes and agree with the findings and plan of care.

## 2018-06-28 NOTE — P.DS
Providers


Date of admission: 


06/26/18 10:32





Expected date of discharge: 06/27/18


Attending physician: 


Joseph Monroe MD





Consults: 





 





06/26/18 10:32


Consult Physician Stat 


   Consulting Provider: Andrade Elliott


   Consult Reason/Comments: Left-sided facial numbness


   Do you want consulting provider notified?: Yes


Consult Physician Urgent 


   Consulting Provider: Kai Angel


   Consult Reason/Comments: chest pain


   Do you want consulting provider notified?: Yes











Primary care physician: 


Jony PLAZA [facility] Course: 





63-year-old male with history of hypertension and TIA presented to emergency 

department because this morning while he was at work he suddenly felt flushed 

in the face, his eyes were red and the left side of his face around the mouth 

went numb.  He denied having any weakness in any of his extremities or weakness 

in the face.  Currently is feeling in his face is back.  No blurry vision or 

double vision, no slurred speech.  He felt slightly dizzy and had left-sided 

chest pain as well.  The chest pain continued until the time of evaluation.  

The pain was located over the left shoulder and he wasn't sure if it was truly 

chest pain or just shoulder discomfort.  Patient was able to walk okay after he 

had this episode.  No sick contacts.  No recent illness, no fevers or chills, 

no nausea or vomiting.  Patient stated that he had an episode of diarrhea today.





In the ER and throughout the hospitalization his bp was found to be mildly 

elevated at 165-180 systolic and  diastolic.  The rest of the vital signs 

are all within normal limits.  Laboratory findings were all within normal 

normal limits including troponin.  Patient was admitted to the hospital, he was 

placed on telemetry which did not show any abnormalities.  His troponin was 

cycled and that remained negative.  Evaluation by neurology concluded that 

patient did have a TIA.  He had a complete stroke workup including MRI of the 

brain, carotid Dopplers and echocardiogram and CAME back normal.  He also had 

an EEG and that did not show any epileptiform discharges.  Patient was also 

evaluated by cardiology who thought that the patient's symptoms were due to a 

TIA and patient did not need heart catheterization for further workup for 

ischemic heart disease.  Patient did undergo a stress echocardiogram and that 

was negative for ischemic changes.  Patient did not have any recurrent symptoms 

while in the hospital.  It was noted that his blood sugars on his laboratory 

testing were high ranging from 170s-180s.  He was advised to follow-up with his 

primary care physician to screen him for diabetes.  Was also told to exercise 

and to watch his diet.  Aspirin was switched to Plavix by neurology to help 

prevent further events.  He will be discharged home in stable condition.





Discharge diagnoses


TIA, likely secondary to small vessel disease due to hypertension, possibly 

diabetes


Negative cardiac workup, including echocardiogram and stress echo


Patient Condition at Discharge: Good





Plan - Discharge Summary


Discharge Rx Participant: Yes


New Discharge Prescriptions: 


New


   Clopidogrel [Plavix] 75 mg PO DAILY #30 tab





Continue


   buPROPion HCL [Wellbutrin XL] 300 mg PO DAILY


   Calcium Polycarbophil [Fibercon] 1,250 mg PO BID


   Atorvastatin [Lipitor] 40 mg PO HS


   Aspirin EC [Ecotrin Low Dose] 81 mg PO HS


   cloNIDine HCL [Catapres] 0.2 mg PO TID


   amLODIPine [Norvasc] 5 mg PO DAILY


   Lisinopril 40 mg PO DAILY


Discharge Medication List





Aspirin EC [Ecotrin Low Dose] 81 mg PO HS 06/26/18 [History]


Atorvastatin [Lipitor] 40 mg PO HS 06/26/18 [History]


Calcium Polycarbophil [Fibercon] 1,250 mg PO BID 06/26/18 [History]


Lisinopril 40 mg PO DAILY 06/26/18 [History]


amLODIPine [Norvasc] 5 mg PO DAILY 06/26/18 [History]


buPROPion HCL [Wellbutrin XL] 300 mg PO DAILY 06/26/18 [History]


cloNIDine HCL [Catapres] 0.2 mg PO TID 06/26/18 [History]


Clopidogrel [Plavix] 75 mg PO DAILY #30 tab 06/27/18 [Rx]








Follow up Appointment(s)/Referral(s): 


Andrade Elliott MD [STAFF PHYSICIAN] - 2 Weeks (Office is closed.  Please call 

to schedule appointment)


Jony Troncoso DO [Primary Care Provider] - 06/29/18 1:00 pm


(Friday


CHECK AIC AND FASTING GLUCOSE FOR DIABETES AND IMPROVE BLOOD PRESSURE CONTROL 

PER DR MONROE)


Kai Angel MD [STAFF PHYSICIAN] - 07/12/18 9:30 am


(Thursday


Bring ID and medication list with you)


Patient Instructions/Handouts:  Transient Ischemic Attack (DC), Chest Pain (DC)


Activity/Diet/Wound Care/Special Instructions: 


NO RESTRICTION ON DRIVING FROM NEUROLOGY STANDPOINT


Discharge Disposition: HOME SELF-CARE